# Patient Record
Sex: FEMALE | Race: WHITE | NOT HISPANIC OR LATINO | Employment: FULL TIME | URBAN - METROPOLITAN AREA
[De-identification: names, ages, dates, MRNs, and addresses within clinical notes are randomized per-mention and may not be internally consistent; named-entity substitution may affect disease eponyms.]

---

## 2017-05-25 ENCOUNTER — TELEPHONE (OUTPATIENT)
Dept: VASCULAR SURGERY | Facility: CLINIC | Age: 52
End: 2017-05-25

## 2017-05-25 DIAGNOSIS — I83.90 VARICOSE VEIN OF LEG: Primary | ICD-10-CM

## 2017-05-25 NOTE — TELEPHONE ENCOUNTER
----- Message from Vanessa Spear sent at 5/25/2017  1:57 PM CDT -----  Contact: Tabitha with Dr. Abimael Carreno   Pt is being referred over for varicose veins, Information is being faxed over. Please contact Tabitha to schedule appt    Tabitha contact number [phone]  Thanks

## 2017-05-25 NOTE — TELEPHONE ENCOUNTER
Left message with nasreen at Dr. Snow office. Tried reaching the pt, the phone number on file is a on working  Number.

## 2017-06-14 ENCOUNTER — HOSPITAL ENCOUNTER (OUTPATIENT)
Dept: VASCULAR SURGERY | Facility: CLINIC | Age: 52
Discharge: HOME | End: 2017-06-14
Payer: COMMERCIAL

## 2017-06-14 ENCOUNTER — INITIAL CONSULT (OUTPATIENT)
Dept: VASCULAR SURGERY | Facility: CLINIC | Age: 52
End: 2017-06-14
Payer: COMMERCIAL

## 2017-06-14 VITALS
BODY MASS INDEX: 33.12 KG/M2 | SYSTOLIC BLOOD PRESSURE: 133 MMHG | HEART RATE: 84 BPM | WEIGHT: 211 LBS | HEIGHT: 67 IN | TEMPERATURE: 97 F | DIASTOLIC BLOOD PRESSURE: 73 MMHG

## 2017-06-14 DIAGNOSIS — I83.893 VARICOSE VEINS OF BOTH LEGS WITH EDEMA: ICD-10-CM

## 2017-06-14 DIAGNOSIS — I83.90 VARICOSE VEIN OF LEG: ICD-10-CM

## 2017-06-14 PROCEDURE — 93970 EXTREMITY STUDY: CPT | Mod: S$GLB,,, | Performed by: SURGERY

## 2017-06-14 PROCEDURE — 99999 PR PBB SHADOW E&M-EST. PATIENT-LVL III: CPT | Mod: PBBFAC,,, | Performed by: SURGERY

## 2017-06-14 PROCEDURE — 99204 OFFICE O/P NEW MOD 45 MIN: CPT | Mod: S$GLB,,, | Performed by: SURGERY

## 2017-06-14 RX ORDER — LEVOTHYROXINE SODIUM 125 UG/1
TABLET ORAL
COMMUNITY
Start: 2017-05-17 | End: 2021-07-19

## 2017-06-14 RX ORDER — TRIAMTERENE/HYDROCHLOROTHIAZID 37.5-25 MG
TABLET ORAL
COMMUNITY
Start: 2017-05-16 | End: 2017-09-11

## 2017-06-14 RX ORDER — PHENTERMINE HYDROCHLORIDE 37.5 MG/1
TABLET ORAL
COMMUNITY
Start: 2017-05-25 | End: 2021-07-19 | Stop reason: ALTCHOICE

## 2017-06-14 RX ORDER — ESTRADIOL 1 MG/G
GEL TOPICAL
COMMUNITY
Start: 2017-06-01

## 2017-06-14 NOTE — PROGRESS NOTES
Ashli eWst Chery  06/14/2017    HPI:  Patient is a 52 y.o. female hypothyroidism who is here today for evaluation of persistent leg discomfort secondary to significant superficial venous insufficiency: notes worsening edema in PM R > L  No claudication    Had lap marcus by Dr LORENA Gruber for biliary colic; doing well now    No MI/stroke  Tobacco use: none  History of DVT/PE: denies    Pain interfers with daily activities; has attempted elevation and analgesics with minimal relief and pain persists.    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     Social History Main Topics    Smoking status: Not on file    Smokeless tobacco: Not on file    Alcohol use Not on file    Drug use: Unknown    Sexual activity: Not on file     Other Topics Concern    Not on file     Social History Narrative    No narrative on file       Current Outpatient Prescriptions:     DIVIGEL 1 mg (0.1 %) topical gel, , Disp: , Rfl:     phentermine (ADIPEX-P) 37.5 mg tablet, , Disp: , Rfl:     SYNTHROID 125 mcg tablet, , Disp: , Rfl:     triamterene-hydrochlorothiazide 37.5-25 mg (MAXZIDE-25) 37.5-25 mg per tablet, , Disp: , Rfl:     REVIEW OF SYSTEMS:  General: negative; ENT: negative; Allergy and Immunology: negative; Hematological and Lymphatic: Negative; Endocrine: negative; Respiratory: no cough, shortness of breath, or wheezing; Cardiovascular: no chest pain or dyspnea on exertion; Gastrointestinal: no abdominal pain/back, change in bowel habits, or bloody stools; Genito-Urinary: no dysuria, trouble voiding, or hematuria; Musculoskeletal: Leg discomfort secondary to chronic venous insufficiency; Neurological: no TIA or stroke symptoms; Psychiatric: no nervousness, anxiety or depression.    PHYSICAL EXAM:   Right Arm BP -  Sittin/79 (17 1152)  Left Arm BP - Sittin/73 (17 1152)  Pulse: 84  Temp: 97.4 °F (36.3 °C)    General appearance:  Alert, well-appearing, and in no distress.  Oriented to person, place, and time   Neurological: Normal speech, no focal findings noted; CN II - XII grossly intact           Musculoskeletal: Digits/nail without cyanosis/clubbing.  Normal muscle strength/tone.                 Neck: Supple, no significant adenopathy; thyroid is not enlarged                  Carotid bruits cannot be auscultated                Chest:  Clear to auscultation, no wheezes, rales or rhonchi, symmetric air entry     No use of accessory muscles             Cardiac: Normal rate and regular rhythm, S1 and S2 normal; PMI non-displaced          Abdomen: Soft, nontender, nondistended, no masses or organomegaly     No rebound tenderness noted; bowel sounds normal     Pulsatile aortic mass is not palpable.      Extremities:   2+ femoral pulses bilaterally     2+ DPs pedal pulses palpable.     Edema/leg swelling:  R > L  leg     Hyperpigmentation: R > L leg.    LAB RESULTS:  No results found for: K, CREATININE  No results found for: WBC, HCT, PLT  No results found for: HGBA1C  IMAGING:  Venous U/S:   R GSV: 5.8 mm  L GSV: 4.6 mm    R SSV: 3.9 mm  L SSV: no reflux  No DVT    IMP/PLAN:  52 y.o. female with a h/o hypothyroidism who is here today for evaluation of persistent leg discomfort secondary to significant superficial venous insufficiency: notes worsening edema in PM R > L Chronic venous insufficiency - CEAP C4b R > L; edema  She is doing weight loss; has lost 50 lbs  Needs trial of Rx thigh-high 30-40 mm Hg compression  F/u in 3 months and we can see how patient is doing and will then determine if we continue compression therapy or do an ablation of the reflux with EVLT ablation therapy: think she will benefit from future R GSV evlt    Wilfrido Connor MD FACS  Vascular/Endovascular Surgery    The EVLT procedure  will be done as an ambulatory procedure in the office / procedure room under sterile conditions with local anesthesia.Venous U/S:

## 2017-06-14 NOTE — LETTER
June 14, 2017      Abimael Carreno Jr., MD  [address] [facility] [place] [address]           [facility]  [address]  [facility] [address]  Phone: [phone]  Fax: [phone]          Patient: Ashli Chery   MR Number: [id number]   YOB: 1965   Date of Visit: 6/14/2017       Dear Dr. Abimael Carreno Jr.:    Thank you for referring Ashli Chery to me for evaluation. Attached you will find relevant portions of my assessment and plan of care.    If you have questions, please do not hesitate to call me. I look forward to following Ashli Chery along with you.    Sincerely,    Wilfrido Connor MD    Enclosure  CC:  No Recipients    If you would like to receive this communication electronically, please contact [email or web address] or [phone] to request more information on [employer] Link access.    For providers and/or their staff who would like to refer a patient to [facility], please contact us through our one-stop-shop provider referral line, [facility], at [phone].    If you feel you have received this communication in error or would no longer like to receive these types of communications, please e-mail [email or web address]

## 2017-09-11 ENCOUNTER — OFFICE VISIT (OUTPATIENT)
Dept: VASCULAR SURGERY | Facility: CLINIC | Age: 52
End: 2017-09-11
Payer: COMMERCIAL

## 2017-09-11 VITALS
HEART RATE: 76 BPM | BODY MASS INDEX: 33.59 KG/M2 | DIASTOLIC BLOOD PRESSURE: 76 MMHG | SYSTOLIC BLOOD PRESSURE: 128 MMHG | HEIGHT: 66 IN | WEIGHT: 209 LBS | TEMPERATURE: 98 F

## 2017-09-11 DIAGNOSIS — I87.2 VENOUS INSUFFICIENCY OF LEFT LEG: Primary | ICD-10-CM

## 2017-09-11 DIAGNOSIS — I83.893 VARICOSE VEINS OF BOTH LEGS WITH EDEMA: Primary | ICD-10-CM

## 2017-09-11 PROCEDURE — 3008F BODY MASS INDEX DOCD: CPT | Mod: S$GLB,,, | Performed by: SURGERY

## 2017-09-11 PROCEDURE — 99214 OFFICE O/P EST MOD 30 MIN: CPT | Mod: S$GLB,,, | Performed by: SURGERY

## 2017-09-11 PROCEDURE — 99999 PR PBB SHADOW E&M-EST. PATIENT-LVL III: CPT | Mod: PBBFAC,,, | Performed by: SURGERY

## 2017-09-11 RX ORDER — ALPRAZOLAM 0.5 MG/1
0.5 TABLET ORAL ONCE AS NEEDED
Qty: 2 TABLET | Refills: 0 | Status: SHIPPED | OUTPATIENT
Start: 2017-09-11 | End: 2017-09-11

## 2017-09-11 RX ORDER — MELOXICAM 7.5 MG/1
7.5 TABLET ORAL 2 TIMES DAILY
Qty: 10 TABLET | Refills: 0 | Status: SHIPPED | OUTPATIENT
Start: 2017-09-11 | End: 2017-09-16

## 2017-09-11 NOTE — PROGRESS NOTES
Ashli eWst Chery  2017    HPI:  Patient is a 52 y.o. female hypothyroidism who is here today for f/u of persistent leg discomfort secondary to significant superficial venous insufficiency: notes worsening edema in PM R > L. Despite compression - pain / edema persists in R > L legs.  No claudication    Had lap marcus by Dr LORENA Gruber for biliary colic; doing well now    No MI/stroke  Tobacco use: none  History of DVT/PE: denies    Pain interfers with daily activities; has attempted elevation and analgesics with minimal relief and pain persists.    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     Social History Main Topics    Smoking status: Not on file    Smokeless tobacco: Not on file    Alcohol use Not on file    Drug use: Unknown    Sexual activity: Not on file     Other Topics Concern    Not on file     Social History Narrative    No narrative on file       Current Outpatient Prescriptions:     DIVIGEL 1 mg (0.1 %) topical gel, , Disp: , Rfl:     phentermine (ADIPEX-P) 37.5 mg tablet, , Disp: , Rfl:     SYNTHROID 125 mcg tablet, , Disp: , Rfl:     REVIEW OF SYSTEMS:  General: negative; ENT: negative; Allergy and Immunology: negative; Hematological and Lymphatic: Negative; Endocrine: negative; Respiratory: no cough, shortness of breath, or wheezing; Cardiovascular: no chest pain or dyspnea on exertion; Gastrointestinal: no abdominal pain/back, change in bowel habits, or bloody stools; Genito-Urinary: no dysuria, trouble voiding, or hematuria; Musculoskeletal: Leg discomfort secondary to chronic venous insufficiency; Neurological: no TIA or stroke symptoms; Psychiatric: no nervousness, anxiety or depression.    PHYSICAL EXAM:   Right Arm BP - Sittin/77 (17 1316)  Left Arm BP -  Sittin/76 (17 1316)  Pulse: 76  Temp: 97.6 °F (36.4 °C)    General appearance:  Alert, well-appearing, and in no distress.  Oriented to person, place, and time   Neurological: Normal speech, no focal findings noted; CN II - XII grossly intact           Musculoskeletal: Digits/nail without cyanosis/clubbing.  Normal muscle strength/tone.                 Neck: Supple, no significant adenopathy; thyroid is not enlarged                  Carotid bruits cannot be auscultated                Chest:  Clear to auscultation, no wheezes, rales or rhonchi, symmetric air entry     No use of accessory muscles             Cardiac: Normal rate and regular rhythm, S1 and S2 normal; PMI non-displaced          Abdomen: Soft, nontender, nondistended, no masses or organomegaly     No rebound tenderness noted; bowel sounds normal     Pulsatile aortic mass is not palpable.      Extremities:   2+ femoral pulses bilaterally     2+ DPs pedal pulses palpable.     Edema/leg swelling:  R > L  leg     Hyperpigmentation: R > L leg.    LAB RESULTS:  No results found for: K, CREATININE  No results found for: WBC, HCT, PLT  No results found for: HGBA1C  IMAGING:  Venous U/S:   R GSV: 5.8 mm  L GSV: 4.6 mm    R SSV: 3.9 mm  L SSV: no reflux  No DVT    IMP/PLAN:  52 y.o. female with a h/o hypothyroidism who is here today for evaluation of persistent leg discomfort secondary to significant superficial venous insufficiency: notes worsening edema in PM R > L Chronic venous insufficiency - CEAP C4b R > L; edema  She is doing weight loss; has lost 50 lbs  Has done trial of Rx thigh-high 30-40 mm Hg compression - would like definitive  Plan for future R GSV evlt    Wilfrido Connor MD FACS  Vascular/Endovascular Surgery    The EVLT procedure will be done as an ambulatory procedure in the office / procedure room under sterile conditions with local anesthesia.Venous U/S:

## 2017-09-29 ENCOUNTER — TELEPHONE (OUTPATIENT)
Dept: VASCULAR SURGERY | Facility: CLINIC | Age: 52
End: 2017-09-29

## 2017-10-03 DIAGNOSIS — I87.2 VENOUS INSUFFICIENCY OF BOTH LOWER EXTREMITIES: Primary | ICD-10-CM

## 2017-10-04 ENCOUNTER — PROCEDURE VISIT (OUTPATIENT)
Dept: VASCULAR SURGERY | Facility: CLINIC | Age: 52
End: 2017-10-04
Payer: COMMERCIAL

## 2017-10-04 VITALS
WEIGHT: 215 LBS | HEIGHT: 67 IN | BODY MASS INDEX: 33.74 KG/M2 | DIASTOLIC BLOOD PRESSURE: 76 MMHG | HEART RATE: 82 BPM | SYSTOLIC BLOOD PRESSURE: 132 MMHG | TEMPERATURE: 98 F

## 2017-10-04 DIAGNOSIS — I83.893 VARICOSE VEINS OF BOTH LEGS WITH EDEMA: ICD-10-CM

## 2017-10-04 DIAGNOSIS — I87.2 VENOUS INSUFFICIENCY OF RIGHT LEG: Primary | ICD-10-CM

## 2017-10-04 DIAGNOSIS — I87.2 VENOUS INSUFFICIENCY OF LEFT LEG: ICD-10-CM

## 2017-10-04 PROCEDURE — 36478 ENDOVENOUS LASER 1ST VEIN: CPT | Mod: RT,S$GLB,, | Performed by: SURGERY

## 2017-10-04 NOTE — PROCEDURES
Ashli Chery    10/04/2017    Pre-Procedure Diagnosis: Right greater saphenous vein reflux; significant superficial venous insufficiency    Post-Procedure Diagnsosis: Same    Procedure: Laser endovenous ablation of the right greater saphenous vein    Surgeon: Wilfrido Connor MD FACS     Anesthesia: Local    The skin of the leg was prepped and draped in sterile fashion.  Ultrasound-guidance was used throughout the procedure with a portable duplex ultrasound machine.  The right GSV was cannulated below the knee using a micro-puncture system.  An 0.035 wire J-tip followed by a 4-Fr Angiodynamics sheath was placed throughout the right GSV.   Using tumescent anesthesia, the entire sheathed portion of the GSV was anesthesized keeping the vein at least one cm from the skin; Klein pump was used.  Position of the tip of the laser was then reconfirmed to be approximately 2 cm from the saphenofemoral junction.  The 1470 nm laser was then activated and the entire length of the vein was treated at ~ 49 J/cm.  The fiber and sheath were then removed intact.  Duplex confirmed occlusion of the GSV with continued patency of the common femoral vein.   The incision was closed with Steri-strips.   Sterile compression dressings and a compression stocking were applied and the patient was discharged to home in a satisfactory condition.    Cannulation site: Below-the-knee    Sheath length: 44 cm    Watson of power: 7 W    Laser time: 307.2 sec    Joules: 2150.4 J             Wilfrido Connor MD FACS   Vascular & Endovascular Surgery

## 2017-10-09 ENCOUNTER — HOSPITAL ENCOUNTER (OUTPATIENT)
Dept: VASCULAR SURGERY | Facility: CLINIC | Age: 52
Discharge: HOME | End: 2017-10-09
Attending: SURGERY
Payer: COMMERCIAL

## 2017-10-09 ENCOUNTER — OFFICE VISIT (OUTPATIENT)
Dept: VASCULAR SURGERY | Facility: CLINIC | Age: 52
End: 2017-10-09
Payer: COMMERCIAL

## 2017-10-09 VITALS
SYSTOLIC BLOOD PRESSURE: 133 MMHG | TEMPERATURE: 98 F | HEART RATE: 78 BPM | WEIGHT: 219 LBS | DIASTOLIC BLOOD PRESSURE: 70 MMHG | BODY MASS INDEX: 34.37 KG/M2 | HEIGHT: 67 IN

## 2017-10-09 DIAGNOSIS — I87.2 VENOUS INSUFFICIENCY OF RIGHT LEG: ICD-10-CM

## 2017-10-09 DIAGNOSIS — I83.893 VARICOSE VEINS OF BOTH LEGS WITH EDEMA: Primary | ICD-10-CM

## 2017-10-09 DIAGNOSIS — I87.2 VENOUS INSUFFICIENCY OF LEFT LEG: Primary | ICD-10-CM

## 2017-10-09 PROCEDURE — 99214 OFFICE O/P EST MOD 30 MIN: CPT | Mod: S$GLB,,, | Performed by: SURGERY

## 2017-10-09 PROCEDURE — 99999 PR PBB SHADOW E&M-EST. PATIENT-LVL III: CPT | Mod: PBBFAC,,, | Performed by: SURGERY

## 2017-10-09 PROCEDURE — 93971 EXTREMITY STUDY: CPT | Mod: S$GLB,,, | Performed by: SURGERY

## 2017-10-09 RX ORDER — ALPRAZOLAM 0.5 MG/1
0.5 TABLET ORAL ONCE AS NEEDED
Qty: 2 TABLET | Refills: 0 | Status: SHIPPED | OUTPATIENT
Start: 2017-10-09 | End: 2019-05-30

## 2017-10-09 RX ORDER — MELOXICAM 7.5 MG/1
7.5 TABLET ORAL 2 TIMES DAILY
Qty: 10 TABLET | Refills: 0 | Status: SHIPPED | OUTPATIENT
Start: 2017-10-09 | End: 2017-10-14

## 2017-10-09 NOTE — PROGRESS NOTES
Ashli Dodson Vik  10/09/2017    HPI:  Patient is a 52 y.o. female hypothyroidism who is here today for f/u of persistent leg discomfort secondary to significant superficial venous insufficiency: notes worsening edema in PM R > L. Despite compression - pain / edema persists in R > L legs.  No claudication  Had lap marcus by Dr LORENA Gruber for biliary colic; doing well now    S/p  10/4/17: R GSV evlt - no R leg pain / edema    No MI/stroke  Tobacco use: none  History of DVT/PE: denies    Pain interfers with daily activities; has attempted elevation and analgesics with minimal relief and pain persists.    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     Social History Main Topics    Smoking status: Not on file    Smokeless tobacco: Not on file    Alcohol use Not on file    Drug use: Unknown    Sexual activity: Not on file     Other Topics Concern    Not on file     Social History Narrative    No narrative on file       Current Outpatient Prescriptions:     DIVIGEL 1 mg (0.1 %) topical gel, , Disp: , Rfl:     phentermine (ADIPEX-P) 37.5 mg tablet, , Disp: , Rfl:     SYNTHROID 125 mcg tablet, , Disp: , Rfl:     alprazolam (XANAX) 0.5 MG tablet, Take 1 tablet (0.5 mg total) by mouth once as needed for Anxiety. Take one 0.5 mg tablet of xanax 1h before the EVLT procedure.  You may take a second tablet right before the procedure; please check with our nurse., Disp: 2 tablet, Rfl: 0    meloxicam (MOBIC) 7.5 MG tablet, Take 1 tablet (7.5 mg total) by mouth 2 (two) times daily., Disp: 10 tablet, Rfl: 0    Current Facility-Administered Medications:     lidocaine-EPINEPHrine 1%-1:100,000 30 mL, lidocaine HCL 10 mg/ml (1%) 20 mL, sodium bicarbonate 1 mEq/mL (8.4 %) 10 mL in sodium chloride 0.9% 500 mL solution, ,  MISCELLANEOUS, Once, Wilfrido Connor MD    REVIEW OF SYSTEMS:  General: negative; ENT: negative; Allergy and Immunology: negative; Hematological and Lymphatic: Negative; Endocrine: negative; Respiratory: no cough, shortness of breath, or wheezing; Cardiovascular: no chest pain or dyspnea on exertion; Gastrointestinal: no abdominal pain/back, change in bowel habits, or bloody stools; Genito-Urinary: no dysuria, trouble voiding, or hematuria; Musculoskeletal: Leg discomfort secondary to chronic venous insufficiency; Neurological: no TIA or stroke symptoms; Psychiatric: no nervousness, anxiety or depression.    PHYSICAL EXAM:   Right Arm BP - Sittin/84 (10/09/17 1423)  Left Arm BP - Sittin/70 (10/09/17 1423)  Pulse: 78  Temp: 98.2 °F (36.8 °C)    General appearance:  Alert, well-appearing, and in no distress.  Oriented to person, place, and time   Neurological: Normal speech, no focal findings noted; CN II - XII grossly intact           Musculoskeletal: Digits/nail without cyanosis/clubbing.  Normal muscle strength/tone.                 Neck: Supple, no significant adenopathy; thyroid is not enlarged                  Carotid bruits cannot be auscultated                Chest:  Clear to auscultation, no wheezes, rales or rhonchi, symmetric air entry     No use of accessory muscles             Cardiac: Normal rate and regular rhythm, S1 and S2 normal; PMI non-displaced          Abdomen: Soft, nontender, nondistended, no masses or organomegaly     No rebound tenderness noted; bowel sounds normal     Pulsatile aortic mass is not palpable.      Extremities:   2+ femoral pulses bilaterally     2+ DPs pedal pulses palpable.     Edema/leg swelling:  R > L  leg     Hyperpigmentation: R > L leg.    LAB RESULTS:  No results found for: K, CREATININE  No results found for: WBC, HCT, PLT  No results found for: HGBA1C  IMAGING:  R GSV is closed; no DVT    Previous:  Venous U/S:   R GSV: 5.8 mm  L GSV: 4.6 mm    R SSV: 3.9  mm  L SSV: no reflux  No DVT    IMP/PLAN:  52 y.o. female with a h/o hypothyroidism who is here today for evaluation of persistent leg discomfort secondary to significant superficial venous insufficiency: notes worsening edema in PM R > L Chronic venous insufficiency - CEAP C4b R > L; edema  She is doing weight loss; has lost 50 lbs  Has done trial of Rx thigh-high 30-40 mm Hg compression - would like definitive  Has done well from R GSV evlt - no R leg pain / edema  She would like to have the L leg treated in near future for L lower leg persistent pain / edema  If there are any recurrences in R lower leg / lateral - would address R SSV reflux in future    Wilfrido Connor MD FACS  Vascular/Endovascular Surgery    The EVLT procedure will be done as an ambulatory procedure in the office / procedure room under sterile conditions with local anesthesia.Venous U/S:

## 2017-10-09 NOTE — PROGRESS NOTES
Ashli West Chery  10/09/2017    HPI:  Patient is a 52 y.o. female hypothyroidism who is here today for f/u of persistent leg discomfort secondary to significant superficial venous insufficiency: notes worsening edema in PM R > L. Despite compression - pain / edema persists in R > L legs.  No claudication    Had lap marcus by Dr LORENA Gruber for biliary colic; doing well now    No MI/stroke  Tobacco use: none  History of DVT/PE: denies    Pain interfers with daily activities; has attempted elevation and analgesics with minimal relief and pain persists.    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     Social History Main Topics    Smoking status: Not on file    Smokeless tobacco: Not on file    Alcohol use Not on file    Drug use: Unknown    Sexual activity: Not on file     Other Topics Concern    Not on file     Social History Narrative    No narrative on file       Current Outpatient Prescriptions:     DIVIGEL 1 mg (0.1 %) topical gel, , Disp: , Rfl:     phentermine (ADIPEX-P) 37.5 mg tablet, , Disp: , Rfl:     SYNTHROID 125 mcg tablet, , Disp: , Rfl:     alprazolam (XANAX) 0.5 MG tablet, Take 1 tablet (0.5 mg total) by mouth once as needed for Anxiety. Take one 0.5 mg tablet of xanax 1h before the EVLT procedure.  You may take a second tablet right before the procedure; please check with our nurse., Disp: 2 tablet, Rfl: 0    Current Facility-Administered Medications:     lidocaine-EPINEPHrine 1%-1:100,000 30 mL, lidocaine HCL 10 mg/ml (1%) 20 mL, sodium bicarbonate 1 mEq/mL (8.4 %) 10 mL in sodium chloride 0.9% 500 mL solution, , MISCELLANEOUS, Once, Wilfrido Connor MD    REVIEW OF SYSTEMS:  General: negative; ENT: negative; Allergy and Immunology: negative; Hematological and Lymphatic: Negative;  Endocrine: negative; Respiratory: no cough, shortness of breath, or wheezing; Cardiovascular: no chest pain or dyspnea on exertion; Gastrointestinal: no abdominal pain/back, change in bowel habits, or bloody stools; Genito-Urinary: no dysuria, trouble voiding, or hematuria; Musculoskeletal: Leg discomfort secondary to chronic venous insufficiency; Neurological: no TIA or stroke symptoms; Psychiatric: no nervousness, anxiety or depression.    PHYSICAL EXAM:              General appearance:  Alert, well-appearing, and in no distress.  Oriented to person, place, and time   Neurological: Normal speech, no focal findings noted; CN II - XII grossly intact           Musculoskeletal: Digits/nail without cyanosis/clubbing.  Normal muscle strength/tone.                 Neck: Supple, no significant adenopathy; thyroid is not enlarged                  Carotid bruits cannot be auscultated                Chest:  Clear to auscultation, no wheezes, rales or rhonchi, symmetric air entry     No use of accessory muscles             Cardiac: Normal rate and regular rhythm, S1 and S2 normal; PMI non-displaced          Abdomen: Soft, nontender, nondistended, no masses or organomegaly     No rebound tenderness noted; bowel sounds normal     Pulsatile aortic mass is not palpable.      Extremities:   2+ femoral pulses bilaterally     2+ DPs pedal pulses palpable.     Edema/leg swelling:  R > L  leg     Hyperpigmentation: R > L leg.    LAB RESULTS:  No results found for: K, CREATININE  No results found for: WBC, HCT, PLT  No results found for: HGBA1C  IMAGING:  US:     Previous:  Venous U/S:   R GSV: 5.8 mm  L GSV: 4.6 mm    R SSV: 3.9 mm  L SSV: no reflux  No DVT    IMP/PLAN:  52 y.o. female with a h/o hypothyroidism who is here today for evaluation of persistent leg discomfort secondary to significant superficial venous insufficiency: notes worsening edema in PM R > L Chronic venous insufficiency - CEAP C4b R > L; edema  She is doing  weight loss; has lost 50 lbs  Has done trial of Rx thigh-high 30-40 mm Hg compression - would like definitive  Plan for future R GSV evlt    Wilfrido Connor MD FACS  Vascular/Endovascular Surgery    The EVLT procedure will be done as an ambulatory procedure in the office / procedure room under sterile conditions with local anesthesia.Venous U/S:

## 2017-10-31 DIAGNOSIS — I87.2 VENOUS INSUFFICIENCY OF BOTH LOWER EXTREMITIES: Primary | ICD-10-CM

## 2017-11-01 ENCOUNTER — PROCEDURE VISIT (OUTPATIENT)
Dept: VASCULAR SURGERY | Facility: CLINIC | Age: 52
End: 2017-11-01
Payer: COMMERCIAL

## 2017-11-01 VITALS
DIASTOLIC BLOOD PRESSURE: 61 MMHG | WEIGHT: 222 LBS | HEIGHT: 67 IN | TEMPERATURE: 97 F | HEART RATE: 79 BPM | SYSTOLIC BLOOD PRESSURE: 138 MMHG | BODY MASS INDEX: 34.84 KG/M2

## 2017-11-01 DIAGNOSIS — I83.893 VARICOSE VEINS OF BOTH LEGS WITH EDEMA: Primary | ICD-10-CM

## 2017-11-01 DIAGNOSIS — I87.2 VENOUS INSUFFICIENCY OF LEFT LEG: ICD-10-CM

## 2017-11-01 PROCEDURE — 36478 ENDOVENOUS LASER 1ST VEIN: CPT | Mod: LT,S$GLB,, | Performed by: SURGERY

## 2017-11-01 NOTE — PROCEDURES
Ashli Chery    11/01/2017    Pre-Procedure Diagnosis: Left greater saphenous vein reflux; significant superficial venous insufficiency    Post-Procedure Diagnsosis: Same    Procedure:  Laser endovenous ablation of the left greater saphenous vein    Surgeon: Wilfrido Connor MD FACS     Anesthesia: Local    The skin of the leg was prepped and draped in sterile fashion.  Ultrasound-guidance was used throughout the procedure with a portable duplex ultrasound machine.  The GSV was cannulated below the knee using a micro-puncture system.  An 0.035 wire J-tip followed by a 4-Fr Angiodynamics sheath was placed throughout the left GSV.   Using tumescent anesthesia, the entire sheathed portion of the GSV was anesthesized keeping the vein at least one cm from the skin; Klein pump was used.  Position of the tip of the laser was then reconfirmed to be approximately 2 cm from the saphenofemoral junction.  The 1470 nm laser was then activated and the entire length of the vein was treated at ~ 49 J/cm.  The fiber and sheath were then removed intact.  Duplex confirmed occlusion of the GSV with continued patency of the common femoral vein.   The incision was closed with Steri-strips.   Sterile compression dressings and a compression stocking were applied and the patient was discharged to home in a satisfactory condition.    Cannulation site: Below-the-knee    Sheath length: 46 cm    Watson of power: 7 W    Laser time: 369.7 sec    Joules: 2587 J           Wilfrido Connor MD FACS   Vascular & Endovascular Surgery

## 2017-11-03 DIAGNOSIS — I87.2 VENOUS INSUFFICIENCY OF LEFT LEG: Primary | ICD-10-CM

## 2017-11-06 ENCOUNTER — HOSPITAL ENCOUNTER (OUTPATIENT)
Dept: VASCULAR SURGERY | Facility: CLINIC | Age: 52
Discharge: HOME | End: 2017-11-06
Payer: COMMERCIAL

## 2017-11-06 ENCOUNTER — OFFICE VISIT (OUTPATIENT)
Dept: VASCULAR SURGERY | Facility: CLINIC | Age: 52
End: 2017-11-06
Payer: COMMERCIAL

## 2017-11-06 VITALS
WEIGHT: 223 LBS | HEART RATE: 76 BPM | DIASTOLIC BLOOD PRESSURE: 83 MMHG | BODY MASS INDEX: 35 KG/M2 | SYSTOLIC BLOOD PRESSURE: 146 MMHG | HEIGHT: 67 IN | TEMPERATURE: 98 F

## 2017-11-06 DIAGNOSIS — I83.893 VARICOSE VEINS OF BOTH LEGS WITH EDEMA: Primary | ICD-10-CM

## 2017-11-06 DIAGNOSIS — I87.2 VENOUS INSUFFICIENCY OF LEFT LEG: ICD-10-CM

## 2017-11-06 DIAGNOSIS — I87.2 VENOUS INSUFFICIENCY OF BOTH LOWER EXTREMITIES: Primary | ICD-10-CM

## 2017-11-06 PROCEDURE — 93971 EXTREMITY STUDY: CPT | Mod: S$GLB,,, | Performed by: SURGERY

## 2017-11-06 PROCEDURE — 99999 PR PBB SHADOW E&M-EST. PATIENT-LVL III: CPT | Mod: PBBFAC,,, | Performed by: SURGERY

## 2017-11-06 PROCEDURE — 99214 OFFICE O/P EST MOD 30 MIN: CPT | Mod: S$GLB,,, | Performed by: SURGERY

## 2017-11-06 RX ORDER — SULFAMETHOXAZOLE AND TRIMETHOPRIM 800; 160 MG/1; MG/1
1 TABLET ORAL DAILY
Qty: 7 TABLET | Refills: 0 | Status: SHIPPED | OUTPATIENT
Start: 2017-11-06 | End: 2019-05-30

## 2017-11-06 NOTE — PROGRESS NOTES
Ashli Cunhaing  11/06/2017    HPI:  Patient is a 52 y.o. female hypothyroidism who is here today for f/u of persistent leg discomfort secondary to significant superficial venous insufficiency: notes worsening edema in PM R > L. Despite compression - pain / edema persists in R > L legs.  No claudication  Had lap marcus by Dr LORENA Gurber for biliary colic; doing well now    S/p  10/4/17: R GSV evlt - no R leg pain / edema  11/1/17: L GSV evlt 46 cm: doing well    No MI/stroke  Tobacco use: none  History of DVT/PE: denies    Pain interfers with daily activities; has attempted elevation and analgesics with minimal relief and pain persists.    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     Social History Main Topics    Smoking status: Not on file    Smokeless tobacco: Not on file    Alcohol use Not on file    Drug use: Unknown    Sexual activity: Not on file     Other Topics Concern    Not on file     Social History Narrative    No narrative on file       Current Outpatient Prescriptions:     alprazolam (XANAX) 0.5 MG tablet, Take 1 tablet (0.5 mg total) by mouth once as needed for Anxiety. Take one 0.5 mg tablet of xanax 1h before the EVLT procedure.  You may take a second tablet right before the procedure; please check with our nurse., Disp: 2 tablet, Rfl: 0    DIVIGEL 1 mg (0.1 %) topical gel, , Disp: , Rfl:     phentermine (ADIPEX-P) 37.5 mg tablet, , Disp: , Rfl:     SYNTHROID 125 mcg tablet, , Disp: , Rfl:     Current Facility-Administered Medications:     lidocaine-EPINEPHrine 1%-1:100,000 30 mL, lidocaine HCL 10 mg/ml (1%) 20 mL, sodium bicarbonate 1 mEq/mL (8.4 %) 10 mL in sodium chloride 0.9% 500 mL solution, , MISCELLANEOUS, Once, Wilfrido Connor MD    lidocaine-EPINEPHrine 1%-1:100,000 30  mL, lidocaine HCL 10 mg/ml (1%) 20 mL, sodium bicarbonate 1 mEq/mL (8.4 %) 10 mL in sodium chloride 0.9% 500 mL solution, , MISCELLANEOUS, Once, Wilfrido Connor MD    REVIEW OF SYSTEMS:  General: negative; ENT: negative; Allergy and Immunology: negative; Hematological and Lymphatic: Negative; Endocrine: negative; Respiratory: no cough, shortness of breath, or wheezing; Cardiovascular: no chest pain or dyspnea on exertion; Gastrointestinal: no abdominal pain/back, change in bowel habits, or bloody stools; Genito-Urinary: no dysuria, trouble voiding, or hematuria; Musculoskeletal: Leg discomfort secondary to chronic venous insufficiency; Neurological: no TIA or stroke symptoms; Psychiatric: no nervousness, anxiety or depression.    PHYSICAL EXAM:              General appearance:  Alert, well-appearing, and in no distress.  Oriented to person, place, and time   Neurological: Normal speech, no focal findings noted; CN II - XII grossly intact           Musculoskeletal: Digits/nail without cyanosis/clubbing.  Normal muscle strength/tone.                 Neck: Supple, no significant adenopathy; thyroid is not enlarged                  Carotid bruits cannot be auscultated                Chest:  Clear to auscultation, no wheezes, rales or rhonchi, symmetric air entry     No use of accessory muscles             Cardiac: Normal rate and regular rhythm, S1 and S2 normal; PMI non-displaced          Abdomen: Soft, nontender, nondistended, no masses or organomegaly     No rebound tenderness noted; bowel sounds normal     Pulsatile aortic mass is not palpable.      Extremities:   2+ femoral pulses bilaterally     2+ DPs pedal pulses palpable.     Edema/leg swelling:  R > L  leg     Hyperpigmentation: R > L leg.    LAB RESULTS:  No results found for: K, CREATININE  No results found for: WBC, HCT, PLT  No results found for: HGBA1C  IMAGING:  US: L GSV  Is closed; no DVT    R GSV is closed; no DVT    Previous:  Venous U/S:   R GSV:  5.8 mm  L GSV: 4.6 mm    R SSV: 3.9 mm  L SSV: no reflux  No DVT    IMP/PLAN:  52 y.o. female with a h/o hypothyroidism who is here today for evaluation of persistent leg discomfort secondary to significant superficial venous insufficiency: for previous worsening edema in PM R > L Chronic venous insufficiency - CEAP C4b R > L; edema - has now had staged bilateral GSV evlts  She is doing weight loss; has lost 50 lbs  Fu in 3 months     Wilfrido Connor MD FACS  Vascular/Endovascular Surgery    The EVLT procedure will be done as an ambulatory procedure in the office / procedure room under sterile conditions with local anesthesia.Venous U/S:

## 2017-11-29 DIAGNOSIS — I87.2 VENOUS INSUFFICIENCY OF RIGHT LOWER EXTREMITY: Primary | ICD-10-CM

## 2017-11-29 RX ORDER — SULFAMETHOXAZOLE AND TRIMETHOPRIM 800; 160 MG/1; MG/1
2 TABLET ORAL 2 TIMES DAILY
Qty: 28 TABLET | Refills: 0 | Status: SHIPPED | OUTPATIENT
Start: 2017-11-29 | End: 2017-12-06

## 2017-12-04 ENCOUNTER — OFFICE VISIT (OUTPATIENT)
Dept: VASCULAR SURGERY | Facility: CLINIC | Age: 52
End: 2017-12-04
Payer: COMMERCIAL

## 2017-12-04 ENCOUNTER — HOSPITAL ENCOUNTER (OUTPATIENT)
Dept: VASCULAR SURGERY | Facility: CLINIC | Age: 52
Discharge: HOME | End: 2017-12-04
Attending: SURGERY
Payer: COMMERCIAL

## 2017-12-04 VITALS
DIASTOLIC BLOOD PRESSURE: 63 MMHG | HEART RATE: 94 BPM | WEIGHT: 223 LBS | HEIGHT: 67 IN | TEMPERATURE: 99 F | BODY MASS INDEX: 35 KG/M2 | SYSTOLIC BLOOD PRESSURE: 134 MMHG

## 2017-12-04 DIAGNOSIS — I87.2 VENOUS INSUFFICIENCY OF RIGHT LOWER EXTREMITY: ICD-10-CM

## 2017-12-04 DIAGNOSIS — I83.893 VARICOSE VEINS OF BOTH LEGS WITH EDEMA: Primary | ICD-10-CM

## 2017-12-04 PROCEDURE — 99999 PR PBB SHADOW E&M-EST. PATIENT-LVL III: CPT | Mod: PBBFAC,,, | Performed by: SURGERY

## 2017-12-04 PROCEDURE — 93970 EXTREMITY STUDY: CPT | Mod: S$GLB,,, | Performed by: SURGERY

## 2017-12-04 PROCEDURE — 99214 OFFICE O/P EST MOD 30 MIN: CPT | Mod: S$GLB,,, | Performed by: SURGERY

## 2017-12-04 NOTE — PROGRESS NOTES
Ashli Dodson Vik  12/04/2017    HPI:  Patient is a 52 y.o. female hypothyroidism who is here today for f/u of persistent leg discomfort secondary to significant superficial venous insufficiency: notes worsening edema in PM R > L. Despite compression - pain / edema persists in R > L legs.  No claudication  Had lap marcus by Dr LORENA Gruber for biliary colic; doing well now    S/p  10/4/17: R GSV evlt - no R leg pain / edema  11/1/17: L GSV evlt 46 cm: doing well    No MI/stroke  Tobacco use: none  History of DVT/PE: denies    Pain interfers with daily activities; has attempted elevation and analgesics with minimal relief and pain persists.    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     Social History Main Topics    Smoking status: Not on file    Smokeless tobacco: Not on file    Alcohol use Not on file    Drug use: Unknown    Sexual activity: Not on file     Other Topics Concern    Not on file     Social History Narrative    No narrative on file       Current Outpatient Prescriptions:     DIVIGEL 1 mg (0.1 %) topical gel, , Disp: , Rfl:     phentermine (ADIPEX-P) 37.5 mg tablet, , Disp: , Rfl:     sulfamethoxazole-trimethoprim 800-160mg (BACTRIM DS) 800-160 mg Tab, Take 1 tablet by mouth once daily., Disp: 7 tablet, Rfl: 0    sulfamethoxazole-trimethoprim 800-160mg (BACTRIM DS) 800-160 mg Tab, Take 2 tablets by mouth 2 (two) times daily., Disp: 28 tablet, Rfl: 0    SYNTHROID 125 mcg tablet, , Disp: , Rfl:     alprazolam (XANAX) 0.5 MG tablet, Take 1 tablet (0.5 mg total) by mouth once as needed for Anxiety. Take one 0.5 mg tablet of xanax 1h before the EVLT procedure.  You may take a second tablet right before the procedure; please check with our nurse., Disp: 2 tablet, Rfl: 0    Current  Facility-Administered Medications:     lidocaine-EPINEPHrine 1%-1:100,000 30 mL, lidocaine HCL 10 mg/ml (1%) 20 mL, sodium bicarbonate 1 mEq/mL (8.4 %) 10 mL in sodium chloride 0.9% 500 mL solution, , MISCELLANEOUS, Once, Wilfrido Connor MD    lidocaine-EPINEPHrine 1%-1:100,000 30 mL, lidocaine HCL 10 mg/ml (1%) 20 mL, sodium bicarbonate 1 mEq/mL (8.4 %) 10 mL in sodium chloride 0.9% 500 mL solution, , MISCELLANEOUS, Once, Wilfrido Connor MD    REVIEW OF SYSTEMS:  General: negative; ENT: negative; Allergy and Immunology: negative; Hematological and Lymphatic: Negative; Endocrine: negative; Respiratory: no cough, shortness of breath, or wheezing; Cardiovascular: no chest pain or dyspnea on exertion; Gastrointestinal: no abdominal pain/back, change in bowel habits, or bloody stools; Genito-Urinary: no dysuria, trouble voiding, or hematuria; Musculoskeletal: Leg discomfort secondary to chronic venous insufficiency; Neurological: no TIA or stroke symptoms; Psychiatric: no nervousness, anxiety or depression.    PHYSICAL EXAM:   Right Arm BP - Sittin/63 (17 1438)  Left Arm BP - Sittin/78 (17 1438)  Pulse: 94  Temp: 98.5 °F (36.9 °C)    General appearance:  Alert, well-appearing, and in no distress.  Oriented to person, place, and time   Neurological: Normal speech, no focal findings noted; CN II - XII grossly intact           Musculoskeletal: Digits/nail without cyanosis/clubbing.  Normal muscle strength/tone.                 Neck: Supple, no significant adenopathy; thyroid is not enlarged                  Carotid bruits cannot be auscultated                Chest:  Clear to auscultation, no wheezes, rales or rhonchi, symmetric air entry     No use of accessory muscles             Cardiac: Normal rate and regular rhythm, S1 and S2 normal; PMI non-displaced          Abdomen: Soft, nontender, nondistended, no masses or organomegaly     No rebound tenderness noted; bowel sounds normal     Pulsatile  aortic mass is not palpable.      Extremities:   2+ femoral pulses bilaterally     2+ DPs pedal pulses palpable.     Edema/leg swelling:  R > L  leg     Hyperpigmentation: R > L leg.    LAB RESULTS:  No results found for: K, CREATININE  No results found for: WBC, HCT, PLT  No results found for: HGBA1C  IMAGING:  US: L GSV  Is closed; no DVT  R GSV is closed; no DVT    Previous:  Venous U/S:   R GSV: 5.8 mm  L GSV: 4.6 mm    R SSV: 3.9 mm  L SSV: no reflux  No DVT    IMP/PLAN:  52 y.o. female with a h/o hypothyroidism who is here today for evaluation of persistent leg discomfort secondary to significant superficial venous insufficiency: for previous worsening edema in PM R > L Chronic venous insufficiency - CEAP C4b R > L; edema - has now had staged bilateral GSV evlts  She is doing weight loss; has lost 50 lbs  Finish bactrim po for R medial thigh mild erythema - non-blanching, no warmth  Only reflux that remains in R LSV - but asymptomatic  Fu in 1 yr    Wilfrido Connor MD FACS  Vascular/Endovascular Surgery    The EVLT procedure will be done as an ambulatory procedure in the office / procedure room under sterile conditions with local anesthesia.Venous U/S:

## 2019-05-30 ENCOUNTER — OFFICE VISIT (OUTPATIENT)
Dept: UROGYNECOLOGY | Facility: CLINIC | Age: 54
End: 2019-05-30
Payer: COMMERCIAL

## 2019-05-30 VITALS
DIASTOLIC BLOOD PRESSURE: 90 MMHG | BODY MASS INDEX: 34.05 KG/M2 | HEIGHT: 67 IN | SYSTOLIC BLOOD PRESSURE: 140 MMHG | WEIGHT: 216.94 LBS

## 2019-05-30 DIAGNOSIS — N95.2 VAGINAL ATROPHY: ICD-10-CM

## 2019-05-30 DIAGNOSIS — K64.9 HEMORRHOIDS, UNSPECIFIED HEMORRHOID TYPE: ICD-10-CM

## 2019-05-30 DIAGNOSIS — N81.11 CYSTOCELE, MIDLINE: ICD-10-CM

## 2019-05-30 DIAGNOSIS — N81.6 RECTOCELE, FEMALE: ICD-10-CM

## 2019-05-30 DIAGNOSIS — R19.8 IRREGULAR BOWEL HABITS: ICD-10-CM

## 2019-05-30 DIAGNOSIS — N81.9 VAGINAL VAULT PROLAPSE: ICD-10-CM

## 2019-05-30 DIAGNOSIS — N39.46 URINARY INCONTINENCE, MIXED: Primary | ICD-10-CM

## 2019-05-30 PROCEDURE — 51701 PR INSERTION OF NON-INDWELLING BLADDER CATHETERIZATION FOR RESIDUAL UR: ICD-10-PCS | Mod: S$GLB,,, | Performed by: OBSTETRICS & GYNECOLOGY

## 2019-05-30 PROCEDURE — 99999 PR PBB SHADOW E&M-EST. PATIENT-LVL III: CPT | Mod: PBBFAC,,, | Performed by: OBSTETRICS & GYNECOLOGY

## 2019-05-30 PROCEDURE — 99999 PR PBB SHADOW E&M-EST. PATIENT-LVL III: ICD-10-PCS | Mod: PBBFAC,,, | Performed by: OBSTETRICS & GYNECOLOGY

## 2019-05-30 PROCEDURE — 87086 URINE CULTURE/COLONY COUNT: CPT

## 2019-05-30 PROCEDURE — 99205 OFFICE O/P NEW HI 60 MIN: CPT | Mod: 25,S$GLB,, | Performed by: OBSTETRICS & GYNECOLOGY

## 2019-05-30 PROCEDURE — 99205 PR OFFICE/OUTPT VISIT, NEW, LEVL V, 60-74 MIN: ICD-10-PCS | Mod: 25,S$GLB,, | Performed by: OBSTETRICS & GYNECOLOGY

## 2019-05-30 PROCEDURE — 51701 INSERT BLADDER CATHETER: CPT | Mod: S$GLB,,, | Performed by: OBSTETRICS & GYNECOLOGY

## 2019-05-30 RX ORDER — ESTRADIOL 0.1 MG/G
CREAM VAGINAL
Qty: 42.5 G | Refills: 11 | Status: SHIPPED | OUTPATIENT
Start: 2019-05-30 | End: 2021-07-19

## 2019-05-30 RX ORDER — FUROSEMIDE 20 MG/1
TABLET ORAL
COMMUNITY
Start: 2019-05-29 | End: 2023-04-03 | Stop reason: SDUPTHER

## 2019-05-30 NOTE — PATIENT INSTRUCTIONS
Bladder Irritants  Certain foods and drinks have been associated with worsening symptoms of urinary frequency, urgency, urge incontinence, or bladder pain. If you suffer from any of these conditions, you may wish to try eliminating one or more of these foods from your diet and see if your symptoms improve. If bladder symptoms are related to dietary factors, strict adherence to a diet thateliminates the food should bring marked relief in 10 days. Once you are feeling better, you can begin to add foods back into your diet, one at a time. If symptoms return, you will be able to identify the irritant. As you add foods back to your diet it is very important that you drink significant amounts of water.    -----------------------------------------------------------------------------------------------  List of Common Bladder Irritants*  Alcoholic beverages  Apples and apple juice  Cantaloupe  Carbonated beverages  Chili and spicy foods  Chocolate  Citrus fruit  Coffee (including decaffeinated)  Cranberries and cranberry juice  Grapes  Guava  Milk Products: milk, cheese, cottage cheese, yogurt, ice cream  Peaches  Pineapple  Plums  Strawberries  Sugar especially artificial sweeteners, saccharin, aspartame, corn sweeteners, honey, fructose, sucrose, lactose  Tea  Tomatoes and tomato juice  Vitamin B complex  Vinegar  *Most people are not sensitive to ALL of these products; your goal is to find the foods that make YOUR symptoms worse.  ---------------------------------------------------------------------------------------------------    Low-acid fruit substitutions include apricots, papaya, pears and watermelon. Coffee drinkers can drink Kava or other lowacid instant drinks. Tea drinkers can substitute non-citrus herbal and sun brewed teas. Calcium carbonate co-buffered with calcium ascorbate can be substituted for Vitamin C. Prelief is a dietary supplement that works as an acid blocker for the bladder.    Where to get more  information:        Overcoming Bladder Disorders by Olya Rubin and Zuleyma Hall, 1990        You Dont Have to Live with Cystitis! By Aileen Baum, 1988  · http://www.urologymanagement.org/oab    --------------------------------------------------------    1) Mixed urinary incontinence, urge >>> stress:    --urine C&S  --Empty bladder every 3 hours, more frequently after lasix.  Empty well: wait a minute, lean forward on toilet.    --Avoid dietary irritants (see sheet).  Keep diary x 3-5 days to determine your irritants.  --start pelvic floor.  Call to make to appt:  Chapo Samayoa ([place])  [facility]  [address] [place] [address] Ave.  [address] [address]  Phone [phone]  Fax [phone]  **Sees patient in [place] and [place].  Please let her know preferred location when you call.     --URGE: consider medication in future.  Takes 2-4 weeks to see if will have effect.  For dry mouth: get sour, sugar free lozenge or gum.    --consider contribution of prolapse  --STRESS:  Pessary vs. Sling.     2)  Stage 2 cystocele/rectocele, stage 1 vaginal vault prolapse:  --observation vs PT vs pessary vs surgery (robotic/laparoscopic sacral colpopexy)   --if surgery, would need bladder testing to see if need sling for stress incontinence    3)  Vaginal atrophy (dryness):  Use 0.5 gram of estrogen cream in vagina nightly x 2 weeks, then twice a week thereafter.    --may also help bladder urgency/frequency and reduce UTIs  --continue divigel for menopausal symptoms    4)  Hemorrhoids:  --Controlling constipation/straining may help bladder urgency/leakage and fiber may better control cholesterol and blood glucose.  It can also reduce likelihood of hemorrhoid flares.  Start daily fiber.  Take 1 tsp of fiber powder (psyllium or other sugar-free powder).  Mix in 8 oz of water.  Take x 3-5 days.  Then, increase fiber by 1 tsp every 3-5 days until stool is easy to pass.  Stop and continue  at that dose.   Do not exceed 6 tsps/day.  May also use over the counter stool softener 1-2 x/day.  AVOID laxatives.  --consider consult CRS in future    5)  Is considering surgery in future. Call us 2-3 months before any desired surgery.

## 2019-05-30 NOTE — PROGRESS NOTES
"[facility] 4   [address] [address]    Ashli Chery  [id number]  1965  May 30, 2019    Consulting Physician: Dr. Dickens  GYN: Dr. Dickens  Primary M.D.: Primary Doctor No    Chief Complaint   Patient presents with    Consult     cystocele       HPI: Referred for POP. Had hysterectomy for prolapsed uterus in 2016. In the past year patient has had recurrent POP symptoms . Prolapse is more bothersome and inability to empty bladder.      1)  UI:  (+) minor GENIA <<< (+) UUI  X 1years.  (--) pads. Daytime frequency: Q 2 hours.  Nocturia: No (--) dysuria,  (--) hematuria,  (--) frequent UTIs.  (--) complete bladder emptying (needs to lean forward)    2)  POP:  Present. below introitus.  Symptoms:(+)  Achy and "tired" in the pelvic region, irritated and dry, has to push it back in.  (--) vaginal bleeding. (--) vaginal discharge. (+) sexually active.  (--) dyspareunia.   (+)  Vaginal dryness.  (+) vaginal estrogen use (just on thighs) divigel.     3)  BM:  (--) constipation/straining.  (--) chronic diarrhea. (+) hematochezia (history of hemorrhoids).  (--) fecal incontinence.  (--) fecal smearing/urgency.  (--) complete evacuation (isn't entirely sure because the hemorrhoids gets swollen)    Past Medical History  Past Medical History:   Diagnosis Date    Hypothyroid     Venous insufficiency of both lower extremities     chronic BLE edema       Hypothyroid   LE edema (superfiical venous insufficiency)    Past Surgical History  Past Surgical History:   Procedure Laterality Date    BREAST SURGERY      BUNIONECTOMY      CHOLECYSTECTOMY      HYSTERECTOMY     LSC marcus Jan 2017  Breast augmentation 9 years ago   Electrical stim for incontinence by Dr. Dickens     Hysterectomy: Yes - [facility] (at [facility] Dr. Dickens)  Date: October 2016.  Indication: for prolapse.    Cervix present: Yes - thinks so  Ovaries present: No  Other procedures at time of hysterectomy:  " BSO    Past Ob History     x 3.   Largest infant weight: 8 pounds 12 oz   no FAVD. yes 3 episiotomy.      Gynecologic History  LMP: No LMP recorded. Patient has had a hysterectomy.  Age of menarche: 14  Age of menopause: surgical menopause at 51  Menstrual history: irregular sometimes every 3 months, heavy bleeding for the first day or 2  Pap test: a month ago.  History of abnormal paps: Yes - a long time ago, non cancerous on biopsy just hyperplasia. Had cryo tx.  History of STIs:  No  Mammogram: Date of last:  Result: Normal  Colonoscopy: Date of last: hasn't had one. Needs to schedule  DEXA:  Date of last:  at time of hysterectomy.  Result:  normal.     Family History  Family History   Problem Relation Age of Onset    Breast cancer Neg Hx     Cancer Neg Hx     Ovarian cancer Neg Hx     Cervical cancer Neg Hx     Endometrial cancer Neg Hx     Vaginal cancer Neg Hx       Colon CA: No  Breast CA: No  GYN CA: No   CA: No    Social History  Social History     Tobacco Use   Smoking Status Never Smoker   .    Social History     Substance and Sexual Activity   Alcohol Use Yes    Comment: socially   .    Social History     Substance and Sexual Activity   Drug Use Never   .  The patient is .  Resides in [place] [address].  Employment status: currently employed.    Secretarial work    Allergies  Review of patient's allergies indicates:  No Known Allergies    Medications  Current Outpatient Medications on File Prior to Visit   Medication Sig Dispense Refill    DIVIGEL 1 mg (0.1 %) topical gel       furosemide (LASIX) 20 MG tablet       phentermine (ADIPEX-P) 37.5 mg tablet       SYNTHROID 125 mcg tablet       [DISCONTINUED] alprazolam (XANAX) 0.5 MG tablet Take 1 tablet (0.5 mg total) by mouth once as needed for Anxiety. Take one 0.5 mg tablet of xanax 1h before the EVLT procedure.  You may take a second tablet right before the procedure; please check with our nurse. 2 tablet 0     "[DISCONTINUED] sulfamethoxazole-trimethoprim 800-160mg (BACTRIM DS) 800-160 mg Tab Take 1 tablet by mouth once daily. 7 tablet 0     Current Facility-Administered Medications on File Prior to Visit   Medication Dose Route Frequency Provider Last Rate Last Dose    lidocaine-EPINEPHrine 1%-1:100,000 30 mL, lidocaine HCL 10 mg/ml (1%) 20 mL, sodium bicarbonate 1 mEq/mL (8.4 %) 10 mL in sodium chloride 0.9% 500 mL solution   MISCELLANEOUS Once Wilfrido Connor MD        lidocaine-EPINEPHrine 1%-1:100,000 30 mL, lidocaine HCL 10 mg/ml (1%) 20 mL, sodium bicarbonate 1 mEq/mL (8.4 %) 10 mL in sodium chloride 0.9% 500 mL solution   MISCELLANEOUS Once Wilfrido Connor MD           Review of Systems A 14 point ROS was reviewed with pertinent positives as noted above in the history of present illness.      Constitutional: negative  Eyes: negative  Endocrine: negative  Gastrointestinal: negative  Cardiovascular: negative  Respiratory: negative  Allergic/Immunologic: negative  Integumentary: negative  Psychiatric: negative  Musculoskeletal: negative   Ear/Nose/Throat: negative  Neurologic: negative  Genitourinary: SEE HPI  Hematologic/Lymphatic: negative   Breast: negative    Urogynecologic Exam  BP (!) 140/90 (BP Location: Right arm, Patient Position: Sitting, BP Method: Large (Manual))   Ht 5' 6.5" (1.689 m)   Wt 98.4 kg (216 lb 14.9 oz)   BMI 34.49 kg/m²     GENERAL APPEARANCE:  The patient is well-developed, well-nourished.  Neck:  Supple with no thyromegaly, no carotid bruits.  Heart:  Regular rate and rhythm, no murmurs, rubs or gallops.  Lungs:  Clear.  No CVA tenderness.  Abdomen:  Soft, nontender, nondistended, no hepatosplenomegaly.  Incisions:  LSC well-healed    PELVIC:    External genitalia:  Normal Bartholins, Skenes and labia bilaterally.    Urethra:  No caruncle, diverticulum or masses.  (+) hypermobility.    Vagina:  Atrophy (+) , no bladder masses or tender, no discharge.    Cervix:  absent  Uterus: uterus " absent  Adnexa: Not palpable.    POP-Q:  Aa +1; Ba +1; C -5; Ap 0; Bp 0.  Genital hiatus 4, perineal body 2, total vaginal length 10-11.    NEUROLOGIC:  Cranial nerves 2 through 12 intact.  Strength 5/5.  DTRs 2+ lower extremities.  S2 through 4 normal.  Sacral reflexes intact.    EXT: [place], 2+ pulses bilaterally, no C/C/E    COUGH STRESS TEST:  negative  KEGEL: 1 /5    RECTAL:    External:  Normal, (+) hemorrhoids, (--) dovetailing.   Internal:  deferred    PVR: 20 mL    Impression    1. Urinary incontinence, mixed    2. Vaginal atrophy    3. Cystocele, midline    4. Rectocele, female    5. Vaginal vault prolapse    6. Hemorrhoids, unspecified hemorrhoid type    7. Irregular bowel habits        Initial Plan  The patient was counseled regarding these issues. The patient was given a summary sheet containing each of these issues with possible options for evaluation and management. When appropriate, we also reviewed computer-generated diagrams specific to their diagnoses..  All questions were addressed to the patient's satisfaction.    1) Mixed urinary incontinence, urge >>> stress:    --urine C&S  --Empty bladder every 3 hours, more frequently after lasix.  Empty well: wait a minute, lean forward on toilet.    --Avoid dietary irritants (see sheet).  Keep diary x 3-5 days to determine your irritants.  --start pelvic floor.  Call to make to appt:  Chapo Samayoa ([place])  [facility]  [address] [address]  Phone [phone]  Fax [phone]  **Sees patient in [place] and [place].  Please let her know preferred location when you call.     --URGE: consider medication in future.  Takes 2-4 weeks to see if will have effect.  For dry mouth: get sour, sugar free lozenge or gum.    --consider contribution of prolapse  --STRESS:  Pessary vs. Sling.     2)  Stage 2 cystocele/rectocele, stage 1 vaginal vault prolapse:  --observation vs PT vs pessary vs surgery (robotic/laparoscopic sacral  "colpopexy)   --if surgery, would need bladder testing to see if need sling for stress incontinence    3)  Vaginal atrophy (dryness):  Use 0.5 gram of estrogen cream in vagina nightly x 2 weeks, then twice a week thereafter.    --may also help bladder urgency/frequency and reduce UTIs  --continue divigel for menopausal symptoms    4)  Hemorrhoids:  --Controlling constipation/straining may help bladder urgency/leakage and fiber may better control cholesterol and blood glucose.  It can also reduce likelihood of hemorrhoid flares.  Start daily fiber.  Take 1 tsp of fiber powder (psyllium or other sugar-free powder).  Mix in 8 oz of water.  Take x 3-5 days.  Then, increase fiber by 1 tsp every 3-5 days until stool is easy to pass.  Stop and continue at that dose.   Do not exceed 6 tsps/day.  May also use over the counter stool softener 1-2 x/day.  AVOID laxatives.  --consider consult CRS in future    5)  Is considering surgery in future. Call us 2-3 months before any desired surgery. If she would like surgery, find out what "electrial stim for incontinence" per Dr. Dickens was (InterStim)?    Patient seen with medical student.   Approximately 60 min were spent in consult, 90 % in discussion.   Thank you for requesting consultation of your patient.  I look forward to participating in their care.    Lynnette Moreno  Female Pelvic Medicine and Reconstructive Surgery  [facility] [place], [place]    " none

## 2019-05-30 NOTE — LETTER
May 30, 2019        Feroz Dickens MD  [address]  [place] [address]             [facility]   [address] [address]  Phone: [phone]   Patient: Ashli Chery   MR Number: [id number]   YOB: 1965   Date of Visit: 5/30/2019       Dear Dr. Dickens:    Thank you for referring Ashli Chery to me for evaluation. Attached you will find relevant portions of my assessment and plan of care.    If you have questions, please do not hesitate to call me. I look forward to following Ashli Chery along with you.    Sincerely,      Lynnette Moreno MD            CC  No Recipients    Enclosure

## 2019-05-31 LAB — BACTERIA UR CULT: NO GROWTH

## 2019-06-03 ENCOUNTER — TELEPHONE (OUTPATIENT)
Dept: OBSTETRICS AND GYNECOLOGY | Facility: CLINIC | Age: 54
End: 2019-06-03

## 2019-06-03 ENCOUNTER — TELEPHONE (OUTPATIENT)
Dept: UROGYNECOLOGY | Facility: CLINIC | Age: 54
End: 2019-06-03

## 2019-06-03 NOTE — TELEPHONE ENCOUNTER
Left voice message for pt to give the office a call back at [phone]. Called to relay the following:  Please let the patient know urine C&S was negative for infection.

## 2019-06-03 NOTE — TELEPHONE ENCOUNTER
I informed the patient that her urine testing was negative for infection. Pt stated that she had no questions or concerns.

## 2019-06-03 NOTE — TELEPHONE ENCOUNTER
----- Message from Lynnette Moreno MD sent at 6/1/2019  2:14 PM CDT -----  Please let the patient know urine C&S was negative for infection.  Thanks!

## 2021-07-16 PROBLEM — R73.01 IFG (IMPAIRED FASTING GLUCOSE): Status: ACTIVE | Noted: 2021-07-16

## 2021-07-16 PROBLEM — R60.0 LOCALIZED EDEMA: Status: ACTIVE | Noted: 2021-07-16

## 2021-07-16 PROBLEM — R60.0 LOWER EXTREMITY EDEMA: Status: ACTIVE | Noted: 2021-07-16

## 2021-07-16 PROBLEM — E66.9 OBESITY: Status: ACTIVE | Noted: 2021-07-16

## 2021-07-16 PROBLEM — R60.0 LOCALIZED EDEMA: Status: RESOLVED | Noted: 2021-07-16 | Resolved: 2021-07-16

## 2021-07-16 PROBLEM — F50.819 BINGE EATING DISORDER: Status: ACTIVE | Noted: 2021-07-16

## 2021-07-16 PROBLEM — F50.81 BINGE EATING DISORDER: Status: ACTIVE | Noted: 2021-07-16

## 2021-07-16 PROBLEM — E03.9 HYPOTHYROIDISM: Status: ACTIVE | Noted: 2021-07-16

## 2021-07-16 PROBLEM — E78.5 HYPERLIPIDEMIA: Status: ACTIVE | Noted: 2021-07-16

## 2021-07-19 PROBLEM — Z00.00 ROUTINE GENERAL MEDICAL EXAMINATION AT A HEALTH CARE FACILITY: Status: ACTIVE | Noted: 2021-07-19

## 2021-08-16 PROBLEM — R79.82 ELEVATED C-REACTIVE PROTEIN (CRP): Status: ACTIVE | Noted: 2021-08-16

## 2021-10-18 PROBLEM — Z00.00 ROUTINE GENERAL MEDICAL EXAMINATION AT A HEALTH CARE FACILITY: Status: RESOLVED | Noted: 2021-07-19 | Resolved: 2021-10-18

## 2023-05-26 PROBLEM — R73.03 PREDIABETES: Status: ACTIVE | Noted: 2021-07-16

## 2023-05-26 PROBLEM — E55.9 VITAMIN D DEFICIENCY: Status: ACTIVE | Noted: 2023-05-26

## 2023-07-03 PROBLEM — Z00.00 ROUTINE GENERAL MEDICAL EXAMINATION AT A HEALTH CARE FACILITY: Status: RESOLVED | Noted: 2021-07-19 | Resolved: 2023-07-03

## 2024-09-16 PROBLEM — Z00.00 ROUTINE GENERAL MEDICAL EXAMINATION AT A HEALTH CARE FACILITY: Status: ACTIVE | Noted: 2024-09-16

## 2024-10-24 PROBLEM — R03.0 ELEVATED BP WITHOUT DIAGNOSIS OF HYPERTENSION: Status: ACTIVE | Noted: 2024-10-24

## 2024-11-07 RX ORDER — LOSARTAN POTASSIUM 50 MG/1
50 TABLET ORAL DAILY
Qty: 90 TABLET | Refills: 3 | Status: SHIPPED | OUTPATIENT
Start: 2024-11-07 | End: 2025-11-07